# Patient Record
Sex: FEMALE | Race: WHITE | URBAN - METROPOLITAN AREA
[De-identification: names, ages, dates, MRNs, and addresses within clinical notes are randomized per-mention and may not be internally consistent; named-entity substitution may affect disease eponyms.]

---

## 2017-01-20 ENCOUNTER — IMPORTED ENCOUNTER (OUTPATIENT)
Dept: URBAN - METROPOLITAN AREA CLINIC 31 | Facility: CLINIC | Age: 77
End: 2017-01-20

## 2017-01-20 PROBLEM — H10.503: Noted: 2017-01-20

## 2017-01-20 PROBLEM — Z96.1: Noted: 2017-01-20

## 2017-01-20 PROBLEM — H04.123: Noted: 2017-01-20

## 2017-01-20 PROCEDURE — 92015 DETERMINE REFRACTIVE STATE: CPT

## 2017-01-20 PROCEDURE — 92014 COMPRE OPH EXAM EST PT 1/>: CPT

## 2018-01-26 ENCOUNTER — IMPORTED ENCOUNTER (OUTPATIENT)
Dept: URBAN - METROPOLITAN AREA CLINIC 31 | Facility: CLINIC | Age: 78
End: 2018-01-26

## 2018-01-26 PROBLEM — Z96.1: Noted: 2018-01-26

## 2018-01-26 PROBLEM — H04.123: Noted: 2018-01-26

## 2018-01-26 PROBLEM — H10.503: Noted: 2018-01-26

## 2018-01-26 PROCEDURE — 92014 COMPRE OPH EXAM EST PT 1/>: CPT

## 2018-01-26 PROCEDURE — 92015 DETERMINE REFRACTIVE STATE: CPT

## 2019-01-29 ENCOUNTER — IMPORTED ENCOUNTER (OUTPATIENT)
Dept: URBAN - METROPOLITAN AREA CLINIC 31 | Facility: CLINIC | Age: 79
End: 2019-01-29

## 2019-01-29 PROBLEM — H04.123: Noted: 2019-01-29

## 2019-01-29 PROBLEM — H10.503: Noted: 2019-01-29

## 2019-01-29 PROBLEM — Z96.1: Noted: 2019-01-29

## 2019-01-29 PROCEDURE — 92014 COMPRE OPH EXAM EST PT 1/>: CPT

## 2019-01-29 PROCEDURE — 92015 DETERMINE REFRACTIVE STATE: CPT

## 2020-01-28 ENCOUNTER — IMPORTED ENCOUNTER (OUTPATIENT)
Dept: URBAN - METROPOLITAN AREA CLINIC 31 | Facility: CLINIC | Age: 80
End: 2020-01-28

## 2020-01-28 PROBLEM — Z96.1: Noted: 2020-01-28

## 2020-01-28 PROBLEM — H10.503: Noted: 2020-01-28

## 2020-01-28 PROBLEM — H04.123: Noted: 2020-01-28

## 2020-01-28 PROCEDURE — 92015 DETERMINE REFRACTIVE STATE: CPT

## 2020-01-28 PROCEDURE — 92014 COMPRE OPH EXAM EST PT 1/>: CPT

## 2021-01-28 ENCOUNTER — IMPORTED ENCOUNTER (OUTPATIENT)
Dept: URBAN - METROPOLITAN AREA CLINIC 31 | Facility: CLINIC | Age: 81
End: 2021-01-28

## 2021-01-28 PROBLEM — H10.503: Noted: 2021-01-28

## 2021-01-28 PROBLEM — H16.143: Noted: 2021-01-28

## 2021-01-28 PROBLEM — Z96.1: Noted: 2021-01-28

## 2021-01-28 PROBLEM — H04.123: Noted: 2021-01-28

## 2021-01-28 PROCEDURE — 83861 MICROFLUID ANALY TEARS: CPT

## 2021-01-28 PROCEDURE — 92014 COMPRE OPH EXAM EST PT 1/>: CPT

## 2021-01-28 PROCEDURE — 92015 DETERMINE REFRACTIVE STATE: CPT

## 2022-01-28 ENCOUNTER — IMPORTED ENCOUNTER (OUTPATIENT)
Dept: URBAN - METROPOLITAN AREA CLINIC 31 | Facility: CLINIC | Age: 82
End: 2022-01-28

## 2022-01-28 PROBLEM — H10.503: Noted: 2022-01-28

## 2022-01-28 PROBLEM — H04.123: Noted: 2022-01-28

## 2022-01-28 PROBLEM — Z96.1: Noted: 2022-01-28

## 2022-01-28 PROCEDURE — 99214 OFFICE O/P EST MOD 30 MIN: CPT

## 2022-01-28 PROCEDURE — 92015 DETERMINE REFRACTIVE STATE: CPT

## 2022-04-02 ASSESSMENT — TONOMETRY
OS_IOP_MMHG: 17
OD_IOP_MMHG: 15
OS_IOP_MMHG: 16
OS_IOP_MMHG: 17
OS_IOP_MMHG: 17
OD_IOP_MMHG: 17
OD_IOP_MMHG: 15
OD_IOP_MMHG: 14
OD_IOP_MMHG: 16
OD_IOP_MMHG: 17
OS_IOP_MMHG: 14
OS_IOP_MMHG: 16

## 2022-04-02 ASSESSMENT — VISUAL ACUITY
OS_SC: 20/20-2
OD_SC: 20/20-2
OS_CC: 20/25-2
OD_CC: 20/20-2

## 2022-04-02 ASSESSMENT — PACHYMETRY
OS_CT_UM: 591
OD_CT_UM: 593

## 2023-01-27 ENCOUNTER — COMPREHENSIVE EXAM (OUTPATIENT)
Dept: URBAN - METROPOLITAN AREA CLINIC 34 | Facility: CLINIC | Age: 83
End: 2023-01-27

## 2023-01-27 DIAGNOSIS — J44.9: ICD-10-CM

## 2023-01-27 DIAGNOSIS — Z96.1: ICD-10-CM

## 2023-01-27 DIAGNOSIS — H01.02B: ICD-10-CM

## 2023-01-27 DIAGNOSIS — H10.023: ICD-10-CM

## 2023-01-27 DIAGNOSIS — H04.123: ICD-10-CM

## 2023-01-27 DIAGNOSIS — H01.02A: ICD-10-CM

## 2023-01-27 PROCEDURE — 92015 DETERMINE REFRACTIVE STATE: CPT

## 2023-01-27 PROCEDURE — 83861 MICROFLUID ANALY TEARS: CPT

## 2023-01-27 PROCEDURE — 92014 COMPRE OPH EXAM EST PT 1/>: CPT

## 2023-01-27 ASSESSMENT — VISUAL ACUITY
OS_CC: 20/25+2
OD_CC: 20/25
OS_CC: 20/20-2
OD_CC: 20/30-2

## 2023-01-27 ASSESSMENT — TONOMETRY
OS_IOP_MMHG: 16
OD_IOP_MMHG: 16

## 2024-01-29 ENCOUNTER — COMPREHENSIVE EXAM (OUTPATIENT)
Dept: URBAN - METROPOLITAN AREA CLINIC 34 | Facility: CLINIC | Age: 84
End: 2024-01-29

## 2024-01-29 DIAGNOSIS — H04.123: ICD-10-CM

## 2024-01-29 DIAGNOSIS — Z96.1: ICD-10-CM

## 2024-01-29 DIAGNOSIS — H01.02A: ICD-10-CM

## 2024-01-29 DIAGNOSIS — H01.02B: ICD-10-CM

## 2024-01-29 PROCEDURE — 92014 COMPRE OPH EXAM EST PT 1/>: CPT

## 2024-01-29 PROCEDURE — 92015 DETERMINE REFRACTIVE STATE: CPT

## 2024-01-29 PROCEDURE — 83861 MICROFLUID ANALY TEARS: CPT

## 2024-01-29 ASSESSMENT — TONOMETRY
OD_IOP_MMHG: 16
OS_IOP_MMHG: 16

## 2024-01-29 ASSESSMENT — VISUAL ACUITY
OS_CC: 20/25
OS_CC: J2
OD_CC: J2
OD_CC: 20/25